# Patient Record
Sex: FEMALE | Race: WHITE | NOT HISPANIC OR LATINO | Employment: STUDENT | URBAN - NONMETROPOLITAN AREA
[De-identification: names, ages, dates, MRNs, and addresses within clinical notes are randomized per-mention and may not be internally consistent; named-entity substitution may affect disease eponyms.]

---
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
## 2025-01-16 DIAGNOSIS — R42 VERTIGO: Primary | ICD-10-CM

## 2025-01-20 ENCOUNTER — CLINICAL SUPPORT (OUTPATIENT)
Dept: PHYSICAL THERAPY | Facility: CLINIC | Age: 20
End: 2025-01-20
Payer: MEDICAID

## 2025-01-20 DIAGNOSIS — H83.2X1 VESTIBULAR HYPOFUNCTION OF RIGHT EAR: Primary | ICD-10-CM

## 2025-01-20 PROCEDURE — 97162 PT EVAL MOD COMPLEX 30 MIN: CPT | Mod: GP | Performed by: PHYSICAL THERAPIST

## 2025-01-20 NOTE — PROGRESS NOTES
"Physical Therapy Evaluation    Patient Name: Leeanne Sanchez  MRN: [id number]  Evaluation Date: 1/20/2025  Time Calculation  Start Time: 1345  Stop Time: 1415  Time Calculation (min): 30 min    Current Problem  Problem List Items Addressed This Visit             ICD-10-CM    Vestibular hypofunction of right ear - Primary H83.2X1     01/16/2025: EVAL ONLY, AUTH REQ. 100% COVERAGE, 30V PT, CCP     Subjective   General:       Patient reported hx of injury: since 6th grade, she has black outs where her vision goes away, some times she feels shaky, but this is not noticeable to others.  Some times she has to sit to recover.  Never looses consciousness.  Also notes she gets dizziness when she stares at water/liquid moving, ot other spinning or moving objects.  Pt does drive.  Pt rides 4 wheelers-never blacks out while riding vehicle or 4 wheelers, or when fabio, usually happens when standing. Pt did go to ED when she was lightheaded at home point and got referral for ENT, who she saw not too long ago, and his note states weakness R ear, possibly to remote viral infection.  Pt calix not recall having any BP testing done in lye sit stand.    Red Flags:  visual blackouts \"often\"-most days, at times > 1x/day    Precautions:   Fall risk due to visual blackouts  Pain:   Mild 3-4 ache L temple now, worse is 5/10 but not sure if it is in different spots of head or now.    Home Living:     Pt generally gets around home safely      Work:  full time.  stand walk lifting     Prior Function Per Pt/Caregiver Report:   Same for some years now, but prior to 6th grade does not recall having these issues  Pt goals: improve symptoms    OBJECTIVE:  If left blank, assume NT:    TUG: < 13\"    Objective   Vestibular/Balance Assessment:    Occulomotor, Vertigo Tests, etc. +/-   Smooth pursuits -   Saccades -   VOR -   Sharp-Eduardo Test -   [place]-Hallpike L    Stefan-Hallpike R    Roll Test R    Roll Test L    JPSE of C-spine    [place] " "Convergence -, but L eye goes out to the side after converging    HIT    Head shake test        Balance Test Scores    MCTSIB 4/4 she was off   SLS  R 17\",  L > 30\"   DGI 10/12                   Gait:   WNL      Outcome Measures:  DHI = 2    OP EDUCATION:   HEP, pt poc    Today's Treatment:  No treatment billed today as pt requires prior authorization  HEP to be completed daily, exercises include:  Access Code: [email or web address]  URL: [email or web address]  Date: 01/20/2025  Prepared by: Lei Skelton    Exercises  - Seated Gaze Stabilization with Head Rotation  - 3 x daily - 7 x weekly - 3 sets - 30\" hold  - Imaginary Target with Head Nod  - 3 x daily - 7 x weekly - 3 sets - 30\" hold    Assessment       pt is with chronic right ear weakness and visual blackouts, and needs PT to decrease symptoms to improve function.      Based on the history including personal factors and/or comorbidities, examination of body systems including body structures and function, activity limitations, and/or participation restrictions, as well as clinical presentation, patient meets criteria for a moderate complexity evaluation.    Plan     Next visit review of HEP technique, test blood pressure supine to standing.  At some point perform repetitive range of motion on cervical spine to see if any effect on headache.  Consider issuing pencil push-ups for HEP.  Skilled PT consisting of:  Aquatics, therapeutic exercise, therapeutic activity, NMR, manual, thermal, electric stimulation, US, light therapy, gait training, transfer training, dry needle.  Mechanical traction PRN and only if OK by PT, canalith repositioning  Rehab Potential: good  Frequency:  2x/wk  Duration: 20 weeks      Goals:    STG:   Pt to be I in initial HEP.    LTG\"s:  Patient to state 50% decrease in episodes of visual blacking out.  Single-leg stance 2 x 30 seconds each for improved stability.  Decrease maximal headache pain to 2-3 out of 10.    Some of This note was created " using voice recognition software and was not corrected for typographical or grammatical errors.

## 2025-02-19 ENCOUNTER — TREATMENT (OUTPATIENT)
Dept: PHYSICAL THERAPY | Facility: CLINIC | Age: 20
End: 2025-02-19
Payer: MEDICAID

## 2025-02-19 DIAGNOSIS — H83.2X1 VESTIBULAR HYPOFUNCTION OF RIGHT EAR: ICD-10-CM

## 2025-02-19 PROCEDURE — 97112 NEUROMUSCULAR REEDUCATION: CPT | Mod: GP | Performed by: PHYSICAL THERAPIST

## 2025-02-19 NOTE — PROGRESS NOTES
"Physical Therapy Treatment    Patient Name: Leeanne Sanchez  \"Cat\"  MRN: [id number]  Encounter date:  2/19/2025  Time Calculation  Start Time: 1000  Stop Time: 1038  Time Calculation (min): 38 min     PT Therapeutic Procedures Time Entry  Neuromuscular Re-Education Time Entry: 38    Visit Number:  2 (including evaluation)  Planned total visits: 10-20  Visit Authorized:   Auth Rcvd 20 visits 2/4-4/15 CPTs [id number]    01/16/2025: EVAL ONLY, AUTH REQ. 100% COVERAGE, 30V PT, [facility]     Current Problem  Problem List Items Addressed This Visit             ICD-10-CM    Vestibular hypofunction of right ear H83.2X1       Precautions    Fall risk due to visual blackouts     Pain   0  Location  description  Subjective  General        Pt states continues to have visual blackouts, but not sure how often in last 2 weeks, states \"not too often\" .  pt had L temple pain this morning and took excederine migraine pill and pain went away.  Works as a cook at fast food.  States no progress with how often she has blackouts, but she reports less frequency compared to I.E.    Objective  + decr tracking with L eye with pencil push up  BP supine after resting 5 minutes: 110/62  BP with initial standing after being supine for several minutes: 122/70    Treatment:     Seated on plinth:  VOR x 30 seconds horizontal  VOR x 30 seconds vertical  Seated on fifth disc:   VOR vertical 2 x 30 seconds   VOR horizontal 2 x 30 seconds  Pencil push-ups 3 x 30 seconds  Eyes open with narrow base of support 2 minutes x 2  Eyes closed with narrow base of support 1 minute    Standing:  Single-leg stance up to 30 seconds each      OP EDUCATION:   And need to do VOR 30 seconds and 30 seconds for 5 minutes 3 times a day, and that this will take a couple or few months to make significant improvements for her vestibular hypofunction  Unclear if this could be causing any of her reported visual blackouts  Blood pressure does not seem to be " "a factor in causing any of her visual blackouts    Assessment:     Pt's response to treatment:  pt needed cues to keep gaze with pencil push ups, as L eye was not tracking pencil, and then with good form.   Areas of improvements:  HEP knowledge  Limitations/deficits:  visual black outs    Pain end of session: No pain    Continued PT required to reach all goals and restore function    Plan      Next visit review of HEP technique, test blood pressure supine to standing.  At some point perform repetitive range of motion on cervical spine to see if any effect on headache.  Consider issuing pencil push-ups for HEP.    Skilled PT consisting of:  Aquatics, therapeutic exercise, therapeutic activity, NMR, manual, thermal, electric stimulation, US, light therapy, gait training, transfer training, dry needle.  Mechanical traction PRN and only if OK by PT, canalith repositioning  Rehab Potential: good  Frequency:  2x/wk  Duration: 20 weeks        Goals:     STG:   Pt to be I in initial HEP.     LTG\"s:  Patient to state 50% decrease in episodes of visual blacking out.  Single-leg stance 2 x 30 seconds each for improved stability.  Decrease maximal headache pain to 2-3 out of 10.    Some of This note was created using voice recognition software and was not corrected for typographical or grammatical errors.          "

## 2025-02-24 ENCOUNTER — TREATMENT (OUTPATIENT)
Dept: PHYSICAL THERAPY | Facility: CLINIC | Age: 20
End: 2025-02-24
Payer: MEDICAID

## 2025-02-24 DIAGNOSIS — H83.2X1 VESTIBULAR HYPOFUNCTION OF RIGHT EAR: ICD-10-CM

## 2025-02-24 DIAGNOSIS — R55 BLACK-OUT (NOT AMNESIA): Primary | ICD-10-CM

## 2025-02-24 PROCEDURE — 97112 NEUROMUSCULAR REEDUCATION: CPT | Mod: GP | Performed by: PHYSICAL THERAPIST

## 2025-02-24 PROCEDURE — 97110 THERAPEUTIC EXERCISES: CPT | Mod: GP | Performed by: PHYSICAL THERAPIST

## 2025-02-24 NOTE — PROGRESS NOTES
"Physical Therapy Treatment    Patient Name: Leeanne Sanchez  \"Cat\"  MRN: [id number]  Encounter date:  2/24/2025  Time Calculation  Start Time: 1347  Stop Time: 1425  Time Calculation (min): 38 min     PT Therapeutic Procedures Time Entry  Neuromuscular Re-Education Time Entry: 13  Therapeutic Exercise Time Entry: 25    Visit Number:  3 (including evaluation)  Planned total visits: 10-20  Visit Authorized:   Auth Rcvd 20 visits 2/4-4/15 CPTs [id number]    01/16/2025: EVAL ONLY, AUTH REQ. 100% COVERAGE, 30V PT, [facility]     Current Problem  Problem List Items Addressed This Visit             ICD-10-CM    Vestibular hypofunction of right ear H83.2X1       Precautions    Fall risk due to visual blackouts     Pain   4-5/10 pt states for at least a month she has had right UBP, no BHAKTI  Location right upper back  Description pain    Subjective  General   No changes noticed as yet.     Prior:  Pt states continues to have visual blackouts, but not sure how often in last 2 weeks, states \"not too often\" .  pt had L temple pain this morning and took excederine migraine pill and pain went away.  Works as a cook at fast food.  States no progress with how often she has blackouts, but she reports less frequency compared to I.E.    Objective:  Carlos C-Spine Assessment    S/S WORSE:  overnight to AM at times, more so during the day or like overnight when he first, sometimes in evening   S/S BETTER:  meds  POSTURE:  slouched, forward head and shoulders  POSTURE CORRECTION:  NE  Pt reports their typical sitting surface is a bed, but does not sit a lot.    uses 2 pillows, uses a regular pillow, sleeps on sides mostly  HA fore head at times    Myotomes/MMT's R L   Shoulder abduction 5/5 5/5   Elbow flexion 5/5 5/5   Elbow extension 5/5 5/5   Wrist flexion     Wrist extension                   Cervical spine ROM S/S   Flexion wnl NE   Extension Min decr NE   Protrusion wnl wnl   Retraction Mod decr NE   R rot " "Min decr NE   L rot Min decr NE   R SB wnl NE   L SB wnl NE         S/S During S/S After   Rep ret  D B   Rep PRO     Rep SB                 NE=No effect, C=centralize, A=abolish, p=peripheralize, P=produce, B=better, W=worse D=decrease, I=increase, NW=no worse, NB=no better    provisional classification: posterior derangement, extension preference  No HA s/s with any C-spine ROM today    PRIOR:  + decr tracking with L eye with pencil push up  BP supine after resting 5 minutes: 110/62  BP with initial standing after being supine for several minutes: 122/70    Treatment:     Seated on plinth:  VOR x 30 seconds horizontal  VOR x 30 seconds vertical  Pencil push ups 3 x 30\"  C-retraction 2 x 10  C-spine ROM per objective section  Posture education including ergonomics to improve posture    Seated on fifth disc: DNP  VOR vertical 2 x 30 seconds   VOR horizontal 2 x 30 seconds  Pencil push-ups 3 x 30 seconds  Eyes open with narrow base of support 2 minutes x 2  Eyes closed with narrow base of support 1 minute    Standing: DNP  Single-leg stance up to 30 seconds each      OP EDUCATION:    New HEP:    Access Code: [email or web address]  URL: [email or web address]  Date: 02/24/2025  Prepared by: Lei Skelton    Exercises  - Neck Retraction  - 3-5 x daily - 7 x weekly - 1 sets - 10 reps  - Pencil Pushups  - 1 x daily - 7 x weekly - 3 sets - 10 reps  - Seated Correct Posture  - 7 x weekly  Prior:   need to do VOR 30 seconds on and 30 seconds off for 5 minutes 3 times a day, and that this will take a couple or few months to make significant improvements for her vestibular hypofunction  Unclear if this could be causing any of her reported visual blackouts  Blood pressure does not seem to be a factor in causing any of her visual blackouts    Assessment:     Pt's response to treatment:  pt no longer needs cues to keep gaze with pencil push ups,.  Pt with improving neck retraction after cues and demo, but not great yet  Areas of " "improvements:  HEP knowledge, posture knowledge  Limitations/deficits:  visual black outs    Pain end of session: No pain    Continued PT required to reach all goals and restore function    Plan      Next session work on more VOR related activity, posture, diaphragmatic breathing.  Ask if correcting posture and if she has had less pain.  Give handout for neuro referral from EMR.    Skilled PT consisting of:  Aquatics, therapeutic exercise, therapeutic activity, NMR, manual, thermal, electric stimulation, US, light therapy, gait training, transfer training, dry needle.  Mechanical traction PRN and only if OK by PT, canalith repositioning  Rehab Potential: good  Frequency:  2x/wk  Duration: 20 weeks        Goals:     STG:   Pt to be I in initial HEP.     LTG\"s:  Patient to state 50% decrease in episodes of visual blacking out.  Single-leg stance 2 x 30 seconds each for improved stability.  Decrease maximal headache pain to 2-3 out of 10.    Some of This note was created using voice recognition software and was not corrected for typographical or grammatical errors.          "

## 2025-02-26 ENCOUNTER — TREATMENT (OUTPATIENT)
Dept: PHYSICAL THERAPY | Facility: CLINIC | Age: 20
End: 2025-02-26
Payer: MEDICAID

## 2025-02-26 DIAGNOSIS — H83.2X1 VESTIBULAR HYPOFUNCTION OF RIGHT EAR: ICD-10-CM

## 2025-02-26 PROCEDURE — 97112 NEUROMUSCULAR REEDUCATION: CPT | Mod: GP | Performed by: PHYSICAL THERAPIST

## 2025-02-26 NOTE — PROGRESS NOTES
"Physical Therapy Treatment    Patient Name: Leeanne Sanchez  \"Cat\"  MRN: [id number]  Encounter date:  2/26/2025  Time Calculation  Start Time: 1000  Stop Time: 1038  Time Calculation (min): 38 min     PT Therapeutic Procedures Time Entry  Neuromuscular Re-Education Time Entry: 38    Visit Number:  4 (including evaluation)  Planned total visits: 10-20  Visit Authorized:   Auth Rcvd 20 visits 2/4-4/15 CPTs [id number]    01/16/2025: EVAL ONLY, AUTH REQ. 100% COVERAGE, 30V PT, [facility]     Current Problem  Problem List Items Addressed This Visit             ICD-10-CM    Vestibular hypofunction of right ear H83.2X1       Precautions    Fall risk due to visual blackouts     Pain   0  Location right upper back  Description pain    Subjective  General   does not have pain today     Prior:  Pt states continues to have visual blackouts, but not sure how often in last 2 weeks, states \"not too often\" .  pt had L temple pain this morning and took excederine migraine pill and pain went away.  Works as a cook at fast food.  States no progress with how often she has blackouts, but she reports less frequency compared to I.E.    Objective:  Sits in slumped posture on plinth    Prior:  Carlos C-Spine Assessment    S/S WORSE:  overnight to AM at times, more so during the day or like overnight when he first, sometimes in evening   S/S BETTER:  meds  POSTURE:  slouched, forward head and shoulders  POSTURE CORRECTION:  NE  Pt reports their typical sitting surface is a bed, but does not sit a lot.    uses 2 pillows, uses a regular pillow, sleeps on sides mostly  HA fore head at times    Myotomes/MMT's R L   Shoulder abduction 5/5 5/5   Elbow flexion 5/5 5/5   Elbow extension 5/5 5/5   Wrist flexion     Wrist extension                   Cervical spine ROM S/S   Flexion wnl NE   Extension Min decr NE   Protrusion wnl wnl   Retraction Mod decr NE   R rot Min decr NE   L rot Min decr NE   R SB wnl NE   L SB wnl NE " "        S/S During S/S After   Rep ret  D B   Rep PRO     Rep SB                 NE=No effect, C=centralize, A=abolish, p=peripheralize, P=produce, B=better, W=worse D=decrease, I=increase, NW=no worse, NB=no better    provisional classification: posterior derangement, extension preference  No HA s/s with any C-spine ROM today    PRIOR:  + decr tracking with L eye with pencil push up  BP supine after resting 5 minutes: 110/62  BP with initial standing after being supine for several minutes: 122/70    Treatment:     Seated on plinth:  VOR 2 x 30 seconds horizontal  VOR 2 x 30 seconds vertical  Pencil push ups 3 x 30\"  C-retraction 2 x 10 (on chair)  Posture education and performance of slouch correct 10 x 10\"    Seated on fifth disc: DNP  VOR vertical 2 x 30 seconds   VOR horizontal 2 x 30 seconds  Pencil push-ups 3 x 30 seconds  Eyes open with narrow base of support 2 minutes x 2  Eyes closed with narrow base of support 1 minute    Standing:   Single-leg stance 2x 30 seconds each   tandem 2 x 30\"  Stand on foam NBOS  VOR vertical 2 x 30 seconds   VOR horizontal 2 x 30 seconds    Supine:  Diaphragmatic breathing with 3# on abdomen, x 3'      OP EDUCATION:  Printed neuro referral from [facility] for pt  Benefits of diaphragmatic breathing    New HEP:    Access Code: [email or web address]  URL: [email or web address]  Date: 02/24/2025  Prepared by: Lei Skelton    Exercises  - Neck Retraction  - 3-5 x daily - 7 x weekly - 1 sets - 10 reps  - Pencil Pushups  - 1 x daily - 7 x weekly - 3 sets - 10 reps  - Seated Correct Posture  - 7 x weekly  Prior:   need to do VOR 30 seconds on and 30 seconds off for 5 minutes 3 times a day, and that this will take a couple or few months to make significant improvements for her vestibular hypofunction  Unclear if this could be causing any of her reported visual blackouts  Blood pressure does not seem to be a factor in causing any of her visual blackouts    Assessment:     Pt's response to " "treatment: Pt needed more cues for improving neck retraction, including watching a short video on this  Areas of improvements:  HEP knowledge, posture knowledge  Limitations/deficits:  visual black outs    Pain end of session: No pain    Continued PT required to reach all goals and restore function    Plan      Next session work on more VOR related activity, posture, diaphragmatic breathing.  Give handout for diaphragmatic breathing.      Skilled PT consisting of:  Aquatics, therapeutic exercise, therapeutic activity, NMR, manual, thermal, electric stimulation, US, light therapy, gait training, transfer training, dry needle.  Mechanical traction PRN and only if OK by PT, canalith repositioning  Rehab Potential: good  Frequency:  2x/wk  Duration: 20 weeks        Goals:     STG:   Pt to be I in initial HEP.     LTG\"s:  Patient to state 50% decrease in episodes of visual blacking out.  Single-leg stance 2 x 30 seconds each for improved stability.  Decrease maximal headache pain to 2-3 out of 10.    Some of This note was created using voice recognition software and was not corrected for typographical or grammatical errors.          "

## 2025-03-10 ENCOUNTER — TREATMENT (OUTPATIENT)
Dept: PHYSICAL THERAPY | Facility: CLINIC | Age: 20
End: 2025-03-10
Payer: MEDICAID

## 2025-03-10 DIAGNOSIS — H83.2X1 VESTIBULAR HYPOFUNCTION OF RIGHT EAR: ICD-10-CM

## 2025-03-10 PROCEDURE — 97112 NEUROMUSCULAR REEDUCATION: CPT | Mod: GP | Performed by: PHYSICAL THERAPIST

## 2025-03-10 PROCEDURE — 97110 THERAPEUTIC EXERCISES: CPT | Mod: GP | Performed by: PHYSICAL THERAPIST

## 2025-03-10 NOTE — PROGRESS NOTES
"Physical Therapy Treatment    Patient Name: Leeanne Sanchez  \"Cat\"  MRN: [id number]  Encounter date:  3/10/2025  Time Calculation  Start Time: 1050  Stop Time: 1128  Time Calculation (min): 38 min     PT Therapeutic Procedures Time Entry  Neuromuscular Re-Education Time Entry: 23  Therapeutic Exercise Time Entry: 15    Visit Number:  5 (including evaluation)  Planned total visits: 10-20  Visit Authorized:   Auth Rcvd 20 visits 2/4-4/15 CPTs [phone] [id number]    01/16/2025: EVAL ONLY, AUTH REQ. 100% COVERAGE, 30V PT, [facility]     Current Problem  Problem List Items Addressed This Visit             ICD-10-CM    Vestibular hypofunction of right ear H83.2X1       Precautions    Fall risk due to visual blackouts     Pain   0  Location right upper back  Description pain    Subjective  General   does not have pain today.  No visual blackouts yesterday or today-but has been off of work for these days.     Prior:  Pt states continues to have visual blackouts, but not sure how often in last 2 weeks, states \"not too often\" .  pt had L temple pain this morning and took excederine migraine pill and pain went away.  Works as a cook at fast food.  States no progress with how often she has blackouts, but she reports less frequency compared to I.E.    Objective:  Sits in slouched posture on plinth    Prior:  Carlos C-Spine Assessment    S/S WORSE:  overnight to AM at times, more so during the day or like overnight when he first, sometimes in evening   S/S BETTER:  meds  POSTURE:  slouched, forward head and shoulders  POSTURE CORRECTION:  NE  Pt reports their typical sitting surface is a bed, but does not sit a lot.    uses 2 pillows, uses a regular pillow, sleeps on sides mostly  HA fore head at times    Myotomes/MMT's R L   Shoulder abduction 5/5 5/5   Elbow flexion 5/5 5/5   Elbow extension 5/5 5/5   Wrist flexion     Wrist extension                   Cervical spine ROM S/S   Flexion wnl NE   Extension Min " "decr NE   Protrusion wnl wnl   Retraction Mod decr NE   R rot Min decr NE   L rot Min decr NE   R SB wnl NE   L SB wnl NE         S/S During S/S After   Rep ret  D B   Rep PRO     Rep SB                 NE=No effect, C=centralize, A=abolish, p=peripheralize, P=produce, B=better, W=worse D=decrease, I=increase, NW=no worse, NB=no better    provisional classification: posterior derangement, extension preference  No HA s/s with any C-spine ROM today    PRIOR:  + decr tracking with L eye with pencil push up  BP supine after resting 5 minutes: 110/62  BP with initial standing after being supine for several minutes: 122/70    Treatment:     Seated on plinth: DNP  VOR 2 x 30 seconds horizontal  VOR 2 x 30 seconds vertical  Pencil push ups 3 x 30\"  Posture education and performance of slouch correct 10 x 10\"    Seated on fifth disc:   VOR vertical 2 x 30 seconds   VOR horizontal 2 x 30 seconds  Pencil push-ups 3 x 30 seconds with and without VOR  Eyes open with narrow base of support 2 minutes x 2  Eyes closed with narrow base of support 1 minute DNP  C-retraction 2 x 10 (on chair)  STS x 12 with VOR    Standing:   Single-leg stance 2x 30 seconds each DNP   tandem 2 x 30\" DNP  Stand on foam NBOS DNP  VOR vertical 2 x 30 seconds   Amb with VOR and VOR with convergence 3 x 50' ea   busy \"B\" chart and DVA chart for VOR    Supine:  Diaphragmatic breathing with 3# on abdomen, x 3'      OP EDUCATION:  Add busy \"B\" chart and DVA chart for VOR for HEP  Ask neurologist what they think is causing visual blackouts    Prior:  Printed neuro referral from [facility] for pt  Benefits of diaphragmatic breathing    New HEP:    Access Code: [email or web address]  URL: [email or web address]  Date: 02/24/2025  Prepared by: Lei Skelton    Exercises  - Neck Retraction  - 3-5 x daily - 7 x weekly - 1 sets - 10 reps  - Pencil Pushups  - 1 x daily - 7 x weekly - 3 sets - 10 reps  - Seated Correct Posture  - 7 x weekly  Prior:   need to do VOR 30 " "seconds on and 30 seconds off for 5 minutes 3 times a day, and that this will take a couple or few months to make significant improvements for her vestibular hypofunction  Unclear if this could be causing any of her reported visual blackouts  Blood pressure does not seem to be a factor in causing any of her visual blackouts    Assessment:     Pt's response to treatment: Pt needed more cues for improving neck retraction including demo from PT  Areas of improvements:  HEP knowledge, posture knowledge  Limitations/deficits:  visual black outs    Pain end of session: No pain    Continued PT required to reach all goals and restore function    Plan      May put on hold soon for HEP focus    Skilled PT consisting of:  Aquatics, therapeutic exercise, therapeutic activity, NMR, manual, thermal, electric stimulation, US, light therapy, gait training, transfer training, dry needle.  Mechanical traction PRN and only if OK by PT, canalith repositioning  Rehab Potential: good  Frequency:  2x/wk  Duration: 20 weeks        Goals:     STG:   Pt to be I in initial HEP.-goal met 3/10/25     LTG\"s:  Patient to state 50% decrease in episodes of visual blacking out.  Single-leg stance 2 x 30 seconds each for improved stability.  Decrease maximal headache pain to 2-3 out of 10.    Some of This note was created using voice recognition software and was not corrected for typographical or grammatical errors.          "

## 2025-03-12 ENCOUNTER — APPOINTMENT (OUTPATIENT)
Dept: PHYSICAL THERAPY | Facility: CLINIC | Age: 20
End: 2025-03-12
Payer: MEDICAID

## 2025-03-18 NOTE — PROGRESS NOTES
Subjective     Patient ID: Leeanne Sanchez is a 19 y.o. female who presents for Establish Care (Here to est care; headaches and chest pain has been an issue; ).    HPI    Vestibular hypofunction of right ear- Saw ENT and is going to PT    Upper back pain, has history of scoliosis, she is slouched over.     Headaches- when she wakes up and sometimes through out the day, she states they happen most mornings. States they are 3-4 days a week. States her headaches are in her temples, achy. She does take Excedrin for these, which does help. Has been going on for a few months, not noted if they start around her period or not.     Wears glasses- had yes checked a year ago.     LMP- 3/14/25    Chest pain- Breathing hurts, states it happens every now and then, it is sharp, stops her from breathing in. Has been going on for a year. Does not radiate, does not know what starts it, does not notice palpitations, at times does have shortness of breath.    Went to ED September last year, she was feeling light headed and dizzy. Labs showed K+ was 3.2, states she took a potassium pill. She     Vestibular hypofunction of right ear. Was seen by ENT, and referred  to PT, she completed this and is now doing her exercises at home. She was having black out, she was not losing consciousness. States she was having temple pain was taking Excedrin and pain went away.     Sister had brain cancer    Works as a cook at fast food. States no progress with how often she has blackouts, but she reports less frequency compared to     Social history:  reports that she has never smoked. Her smokeless tobacco use includes snuff. She reports that she does not drink alcohol and does not use drugs.    Past Medical History:   Diagnosis Date    Acute upper respiratory infection, unspecified 10/27/2021    Viral URI with cough    Body mass index (BMI) pediatric, 85th percentile to less than 95th percentile for age 07/03/2019    BMI (body mass index),  "pediatric, 85% to less than 95% for age    Cellulitis of right finger 04/16/2017    Cellulitis of index finger, right    Contact with and (suspected) exposure to covid-19 03/24/2021    Exposure to COVID-19 virus    Cough, unspecified 04/30/2013    Cough    Encounter for immunization 10/27/2021    Encounter for immunization    Encounter for routine child health examination with abnormal findings 07/03/2019    Encounter for routine child health examination with abnormal findings    Open bite of unspecified finger without damage to nail, subsequent encounter 04/16/2017    Dog bite of finger, subsequent encounter    Pain in left foot 02/08/2019    Left foot pain    Personal history of other diseases of the digestive system 10/27/2016    History of dental abscess    Plantar wart 02/13/2019    Plantar wart, left foot    Scoliosis     Unspecified injury of unspecified foot, initial encounter 09/30/2014    Foot injury      SurgHx:   Past Surgical History:   Procedure Laterality Date    OTHER SURGICAL HISTORY  09/02/2020    No history of surgery      Fhx: No family history on file.   Medications: No current outpatient medications on file.   Allergies: No Known Allergies     Review of systems completed and unremarkable other than what is documented in HPI.    Objective   /85 (BP Location: Right arm, Patient Position: Sitting, BP Cuff Size: Adult)   Pulse 69   Ht 1.575 m (5' 2\")   Wt 63.9 kg (140 lb 12.8 oz)   BMI 25.75 kg/m²     Patient Health Questionnaire-2 Score: 0 (3/21/2025 10:05 AM)    Physical Exam    Gen: No acute distress, alert and oriented x3, pleasant   HEENT: moist mucous membranes, b/l external auditory canals are clear of debris, TMs within normal limits, no oropharyngeal lesions, eomi, perrla   Neck: thyroid within normal limits, no lymphadenopathy   CV: RRR, normal S1/S2, no murmur   Resp: Clear to auscultation bilaterally, no wheezes or rhonchi appreciated  Abd: soft, nontender, non-distended, no " guarding/rigidity, bowel sounds present  Musc: Slight curvature of throcic/lumbar region of spine, pain in thoracic/cervical area- poor posture   Extr: no edema, no calf tenderness  Derm: Skin is warm and dry, no rashes appreciated  Psych: mood is good, affect is congruent, good hygiene, normal speech and eye contact  Neuro: cranial nerves grossly intact, normal gait      Assessment/Plan   Diagnoses and all orders for this visit:  Encounter to establish care  -     TSH with reflex to Free T4 if abnormal; Future  Temporal headache  -     TSH with reflex to Free T4 if abnormal; Future  -     Comprehensive Metabolic Panel; Future  -     ECG 12 lead (Ancillary Performed); Future  Screening for thyroid disorder  -     TSH with reflex to Free T4 if abnormal; Future  Encounter for screening for diseases of the blood and blood-forming organs and certain disorders involving the immune mechanism  -     CBC and Auto Differential; Future  Electrolyte imbalance  -     Comprehensive Metabolic Panel; Future  -     ECG 12 lead (Ancillary Performed); Future  Vestibular hypofunction of right ear  -     TSH with reflex to Free T4 if abnormal; Future  -     Comprehensive Metabolic Panel; Future  -     ECG 12 lead (Ancillary Performed); Future  Thoracogenic scoliosis of thoracolumbar region  Lipid screening  -     Lipid Panel; Future  Chest pain on breathing  -     TSH with reflex to Free T4 if abnormal; Future  -     Comprehensive Metabolic Panel; Future  -     ECG 12 lead (Ancillary Performed); Future      Follow up in 1 year sooner if needed

## 2025-03-21 ENCOUNTER — HOSPITAL ENCOUNTER (OUTPATIENT)
Dept: RADIOLOGY | Facility: HOSPITAL | Age: 20
Discharge: HOME | End: 2025-03-21
Payer: MEDICAID

## 2025-03-21 ENCOUNTER — HOSPITAL ENCOUNTER (OUTPATIENT)
Dept: CARDIOLOGY | Facility: HOSPITAL | Age: 20
Discharge: HOME | End: 2025-03-21
Payer: MEDICAID

## 2025-03-21 ENCOUNTER — APPOINTMENT (OUTPATIENT)
Dept: PRIMARY CARE | Facility: CLINIC | Age: 20
End: 2025-03-21
Payer: MEDICAID

## 2025-03-21 VITALS
WEIGHT: 140.8 LBS | HEIGHT: 62 IN | SYSTOLIC BLOOD PRESSURE: 128 MMHG | BODY MASS INDEX: 25.91 KG/M2 | DIASTOLIC BLOOD PRESSURE: 85 MMHG | HEART RATE: 69 BPM

## 2025-03-21 DIAGNOSIS — M41.35 THORACOGENIC SCOLIOSIS OF THORACOLUMBAR REGION: ICD-10-CM

## 2025-03-21 DIAGNOSIS — E87.8 ELECTROLYTE IMBALANCE: ICD-10-CM

## 2025-03-21 DIAGNOSIS — Z13.0 ENCOUNTER FOR SCREENING FOR DISEASES OF THE BLOOD AND BLOOD-FORMING ORGANS AND CERTAIN DISORDERS INVOLVING THE IMMUNE MECHANISM: ICD-10-CM

## 2025-03-21 DIAGNOSIS — Z76.89 ENCOUNTER TO ESTABLISH CARE: Primary | ICD-10-CM

## 2025-03-21 DIAGNOSIS — R51.9 TEMPORAL HEADACHE: ICD-10-CM

## 2025-03-21 DIAGNOSIS — Z13.29 SCREENING FOR THYROID DISORDER: ICD-10-CM

## 2025-03-21 DIAGNOSIS — R07.1 CHEST PAIN ON BREATHING: ICD-10-CM

## 2025-03-21 DIAGNOSIS — Z13.220 LIPID SCREENING: ICD-10-CM

## 2025-03-21 DIAGNOSIS — H83.2X1 VESTIBULAR HYPOFUNCTION OF RIGHT EAR: ICD-10-CM

## 2025-03-21 LAB
ATRIAL RATE: 55 BPM
P AXIS: 48 DEGREES
P OFFSET: 208 MS
P ONSET: 167 MS
PR INTERVAL: 104 MS
Q ONSET: 219 MS
QRS COUNT: 9 BEATS
QRS DURATION: 92 MS
QT INTERVAL: 440 MS
QTC CALCULATION(BAZETT): 420 MS
QTC FREDERICIA: 427 MS
R AXIS: 96 DEGREES
T AXIS: 40 DEGREES
T OFFSET: 439 MS
VENTRICULAR RATE: 55 BPM

## 2025-03-21 PROCEDURE — 72081 X-RAY EXAM ENTIRE SPI 1 VW: CPT

## 2025-03-21 PROCEDURE — 99204 OFFICE O/P NEW MOD 45 MIN: CPT

## 2025-03-21 PROCEDURE — 3008F BODY MASS INDEX DOCD: CPT

## 2025-03-21 PROCEDURE — 93005 ELECTROCARDIOGRAM TRACING: CPT

## 2025-03-21 ASSESSMENT — PATIENT HEALTH QUESTIONNAIRE - PHQ9
2. FEELING DOWN, DEPRESSED OR HOPELESS: NOT AT ALL
1. LITTLE INTEREST OR PLEASURE IN DOING THINGS: NOT AT ALL
SUM OF ALL RESPONSES TO PHQ9 QUESTIONS 1 & 2: 0

## 2025-03-22 LAB
ALBUMIN SERPL-MCNC: 5 G/DL (ref 3.6–5.1)
ALP SERPL-CCNC: 55 U/L (ref 36–128)
ALT SERPL-CCNC: 12 U/L (ref 5–32)
ANION GAP SERPL CALCULATED.4IONS-SCNC: 9 MMOL/L (CALC) (ref 7–17)
AST SERPL-CCNC: 16 U/L (ref 12–32)
BASOPHILS # BLD AUTO: 50 CELLS/UL (ref 0–200)
BASOPHILS NFR BLD AUTO: 0.8 %
BILIRUB SERPL-MCNC: 0.6 MG/DL (ref 0.2–1.1)
BUN SERPL-MCNC: 13 MG/DL (ref 7–20)
CALCIUM SERPL-MCNC: 10.3 MG/DL (ref 8.9–10.4)
CHLORIDE SERPL-SCNC: 101 MMOL/L (ref 98–110)
CHOLEST SERPL-MCNC: 219 MG/DL
CHOLEST/HDLC SERPL: 3 (CALC)
CO2 SERPL-SCNC: 29 MMOL/L (ref 20–32)
CREAT SERPL-MCNC: 0.81 MG/DL (ref 0.5–0.96)
EGFRCR SERPLBLD CKD-EPI 2021: 107 ML/MIN/1.73M2
EOSINOPHIL # BLD AUTO: 347 CELLS/UL (ref 15–500)
EOSINOPHIL NFR BLD AUTO: 5.5 %
ERYTHROCYTE [DISTWIDTH] IN BLOOD BY AUTOMATED COUNT: 12.5 % (ref 11–15)
GLUCOSE SERPL-MCNC: 87 MG/DL (ref 65–99)
HCT VFR BLD AUTO: 46.9 % (ref 35–45)
HDLC SERPL-MCNC: 74 MG/DL
HGB BLD-MCNC: 15.6 G/DL (ref 11.7–15.5)
LDLC SERPL CALC-MCNC: 125 MG/DL (CALC)
LYMPHOCYTES # BLD AUTO: 2098 CELLS/UL (ref 850–3900)
LYMPHOCYTES NFR BLD AUTO: 33.3 %
MCH RBC QN AUTO: 30.3 PG (ref 27–33)
MCHC RBC AUTO-ENTMCNC: 33.3 G/DL (ref 32–36)
MCV RBC AUTO: 91.1 FL (ref 80–100)
MONOCYTES # BLD AUTO: 447 CELLS/UL (ref 200–950)
MONOCYTES NFR BLD AUTO: 7.1 %
NEUTROPHILS # BLD AUTO: 3358 CELLS/UL (ref 1500–7800)
NEUTROPHILS NFR BLD AUTO: 53.3 %
NONHDLC SERPL-MCNC: 145 MG/DL (CALC)
PLATELET # BLD AUTO: 257 THOUSAND/UL (ref 140–400)
PMV BLD REES-ECKER: 10.6 FL (ref 7.5–12.5)
POTASSIUM SERPL-SCNC: 4.3 MMOL/L (ref 3.8–5.1)
PROT SERPL-MCNC: 7.7 G/DL (ref 6.3–8.2)
RBC # BLD AUTO: 5.15 MILLION/UL (ref 3.8–5.1)
SODIUM SERPL-SCNC: 139 MMOL/L (ref 135–146)
TRIGL SERPL-MCNC: 95 MG/DL
TSH SERPL-ACNC: 0.95 MIU/L
WBC # BLD AUTO: 6.3 THOUSAND/UL (ref 3.8–10.8)

## 2025-05-02 ENCOUNTER — TELEPHONE (OUTPATIENT)
Dept: PRIMARY CARE | Facility: CLINIC | Age: 20
End: 2025-05-02
Payer: MEDICAID

## 2025-05-07 ENCOUNTER — APPOINTMENT (OUTPATIENT)
Dept: CARDIOLOGY | Facility: HOSPITAL | Age: 20
End: 2025-05-07
Payer: MEDICAID

## 2025-05-07 ENCOUNTER — APPOINTMENT (OUTPATIENT)
Dept: RADIOLOGY | Facility: HOSPITAL | Age: 20
End: 2025-05-07
Payer: MEDICAID

## 2025-05-07 ENCOUNTER — HOSPITAL ENCOUNTER (EMERGENCY)
Facility: HOSPITAL | Age: 20
Discharge: HOME | End: 2025-05-07
Attending: FAMILY MEDICINE
Payer: MEDICAID

## 2025-05-07 VITALS
HEART RATE: 59 BPM | OXYGEN SATURATION: 98 % | HEIGHT: 63 IN | SYSTOLIC BLOOD PRESSURE: 126 MMHG | WEIGHT: 140 LBS | BODY MASS INDEX: 24.8 KG/M2 | DIASTOLIC BLOOD PRESSURE: 74 MMHG | RESPIRATION RATE: 18 BRPM | TEMPERATURE: 97.3 F

## 2025-05-07 DIAGNOSIS — R07.89 CHEST WALL PAIN: Primary | ICD-10-CM

## 2025-05-07 PROCEDURE — 2500000004 HC RX 250 GENERAL PHARMACY W/ HCPCS (ALT 636 FOR OP/ED): Mod: JZ,SE | Performed by: FAMILY MEDICINE

## 2025-05-07 PROCEDURE — 71046 X-RAY EXAM CHEST 2 VIEWS: CPT

## 2025-05-07 PROCEDURE — 99284 EMERGENCY DEPT VISIT MOD MDM: CPT | Mod: 25 | Performed by: FAMILY MEDICINE

## 2025-05-07 PROCEDURE — 93005 ELECTROCARDIOGRAM TRACING: CPT

## 2025-05-07 PROCEDURE — 96372 THER/PROPH/DIAG INJ SC/IM: CPT | Performed by: FAMILY MEDICINE

## 2025-05-07 PROCEDURE — 71046 X-RAY EXAM CHEST 2 VIEWS: CPT | Performed by: STUDENT IN AN ORGANIZED HEALTH CARE EDUCATION/TRAINING PROGRAM

## 2025-05-07 PROCEDURE — 2500000004 HC RX 250 GENERAL PHARMACY W/ HCPCS (ALT 636 FOR OP/ED): Mod: JZ,SE

## 2025-05-07 PROCEDURE — 96372 THER/PROPH/DIAG INJ SC/IM: CPT

## 2025-05-07 RX ORDER — ORPHENADRINE CITRATE 30 MG/ML
60 INJECTION INTRAMUSCULAR; INTRAVENOUS ONCE
Status: COMPLETED | OUTPATIENT
Start: 2025-05-07 | End: 2025-05-07

## 2025-05-07 RX ORDER — DICLOFENAC SODIUM 10 MG/G
4 GEL TOPICAL 3 TIMES DAILY PRN
Qty: 50 G | Refills: 0 | Status: SHIPPED | OUTPATIENT
Start: 2025-05-07

## 2025-05-07 RX ORDER — KETOROLAC TROMETHAMINE 30 MG/ML
30 INJECTION, SOLUTION INTRAMUSCULAR; INTRAVENOUS ONCE
Status: COMPLETED | OUTPATIENT
Start: 2025-05-07 | End: 2025-05-07

## 2025-05-07 RX ORDER — METHYLPREDNISOLONE 4 MG/1
TABLET ORAL
Qty: 21 TABLET | Refills: 0 | Status: SHIPPED | OUTPATIENT
Start: 2025-05-07 | End: 2025-05-14

## 2025-05-07 RX ORDER — KETOROLAC TROMETHAMINE 30 MG/ML
INJECTION, SOLUTION INTRAMUSCULAR; INTRAVENOUS
Status: COMPLETED
Start: 2025-05-07 | End: 2025-05-07

## 2025-05-07 RX ORDER — TIZANIDINE HYDROCHLORIDE 4 MG/1
4 CAPSULE, GELATIN COATED ORAL 3 TIMES DAILY PRN
Qty: 20 CAPSULE | Refills: 0 | Status: SHIPPED | OUTPATIENT
Start: 2025-05-07 | End: 2026-05-07

## 2025-05-07 RX ADMIN — KETOROLAC TROMETHAMINE 30 MG: 30 INJECTION, SOLUTION INTRAMUSCULAR; INTRAVENOUS at 19:58

## 2025-05-07 RX ADMIN — ORPHENADRINE CITRATE 60 MG: 60 INJECTION INTRAMUSCULAR; INTRAVENOUS at 19:48

## 2025-05-07 RX ADMIN — KETOROLAC TROMETHAMINE 30 MG: 30 INJECTION, SOLUTION INTRAMUSCULAR at 19:58

## 2025-05-07 ASSESSMENT — COLUMBIA-SUICIDE SEVERITY RATING SCALE - C-SSRS
6. HAVE YOU EVER DONE ANYTHING, STARTED TO DO ANYTHING, OR PREPARED TO DO ANYTHING TO END YOUR LIFE?: NO
2. HAVE YOU ACTUALLY HAD ANY THOUGHTS OF KILLING YOURSELF?: NO
1. IN THE PAST MONTH, HAVE YOU WISHED YOU WERE DEAD OR WISHED YOU COULD GO TO SLEEP AND NOT WAKE UP?: NO

## 2025-05-07 ASSESSMENT — PAIN DESCRIPTION - ORIENTATION: ORIENTATION: RIGHT

## 2025-05-07 ASSESSMENT — PAIN DESCRIPTION - PROGRESSION: CLINICAL_PROGRESSION: GRADUALLY WORSENING

## 2025-05-07 ASSESSMENT — PAIN DESCRIPTION - DESCRIPTORS: DESCRIPTORS: ACHING;BURNING

## 2025-05-07 ASSESSMENT — PAIN DESCRIPTION - FREQUENCY: FREQUENCY: INTERMITTENT

## 2025-05-07 ASSESSMENT — PAIN - FUNCTIONAL ASSESSMENT: PAIN_FUNCTIONAL_ASSESSMENT: 0-10

## 2025-05-07 ASSESSMENT — PAIN DESCRIPTION - LOCATION: LOCATION: BREAST

## 2025-05-07 ASSESSMENT — PAIN DESCRIPTION - PAIN TYPE: TYPE: ACUTE PAIN

## 2025-05-07 ASSESSMENT — PAIN SCALES - GENERAL
PAINLEVEL_OUTOF10: 6
PAINLEVEL_OUTOF10: 8

## 2025-05-08 NOTE — TELEPHONE ENCOUNTER
ECG looked pretty normal, had some bradycardia. Looks like she was just in the ED and that EKG was normal

## 2025-05-08 NOTE — ED PROVIDER NOTES
Emergency Department Provider Note       History of Present Illness     History provided by: Patient  Limitations to History: None  External Records Reviewed with Brief Summary: Reviewed care everywhere/epic chart    HPI:  Leeanne Sanchez is a 20 y.o. female comes the ED with complaint of substernal chest wall pain that began earlier in the day and continue to persist with any kind of heavy lifting/pushing/pulling or stretching of her chest wall.  Patient reports continued persist throughout her day at work and continued to bother her to the point where she has to leave and come to the ED and get evaluated.  Patient reports he took nothing to treat the discomfort as it has continued.  Patient is reported no other associate symptoms with the complaint and states that she is had this issue occur before several times in the past but generally it has resolved.  Patient in the ED is alert, cooperative, appears anxious, uncomfortable, but in no distress.    Physical Exam   Triage vitals:  T 36.6 °C (97.9 °F)  HR 66  BP (!) 142/92  RR 18  O2 99 % None (Room air)    Physical Exam  Vitals and nursing note reviewed. Exam conducted with a chaperone present.   Constitutional:       Appearance: Normal appearance.   HENT:      Head: Normocephalic and atraumatic.      Nose: Nose normal.      Mouth/Throat:      Mouth: Mucous membranes are moist.      Pharynx: Oropharynx is clear.   Eyes:      Extraocular Movements: Extraocular movements intact.      Conjunctiva/sclera: Conjunctivae normal.      Pupils: Pupils are equal, round, and reactive to light.   Cardiovascular:      Rate and Rhythm: Normal rate and regular rhythm.      Pulses: Normal pulses.      Heart sounds: Normal heart sounds, S1 normal and S2 normal.   Pulmonary:      Effort: Pulmonary effort is normal.      Breath sounds: Normal breath sounds and air entry.   Chest:      Chest wall: Tenderness present. No mass, lacerations, deformity, swelling, crepitus or  edema. There is no dullness to percussion.          Comments: Chest wall tender to the touch negative by range of motion  No signs of bruising//redness  Abdominal:      General: Abdomen is flat.   Musculoskeletal:         General: Normal range of motion.      Cervical back: Normal range of motion and neck supple.   Skin:     General: Skin is warm.      Capillary Refill: Capillary refill takes less than 2 seconds.   Neurological:      General: No focal deficit present.      Mental Status: She is alert and oriented to person, place, and time. Mental status is at baseline.      GCS: GCS eye subscore is 4. GCS verbal subscore is 5. GCS motor subscore is 6.   Psychiatric:         Mood and Affect: Mood normal.         XR chest 2 views   Final Result   Findings suggestive of reactive airways disease without evidence for   pneumonia.             MACRO:   None.        Signed by: Ezekiel Avilez 5/7/2025 8:01 PM   Dictation workstation:   [id number]          Medical Decision Making & ED Course     Pt upon arrival to the ED appeared to be anxious and uncomfortable but in no distress with stable vitals.  Discussed with pt the presenting complaints and clinically findings.  Reviewed with pt the epic chart and counseled the patient on chest wall pain and appropriate approach to management/treatments.  After assessment and evaluation patient given IM pain medication, imaging ordered, EKG reviewed, placed on monitor, and observed.  After treatment and a period of rest patient was reassessed and finally feeling little better, pain better controlled, no new physical complaints, vital signs stable, and final results were reviewed discussed with patient.  At this time is felt findings appear to be consistent more with a muscle skeletal strain of the chest wall and patient is given prescriptions for home educated in their appropriate for management.  Patient also encouraged to follow-up with her primary care doctor next several days  for recheck and order return to ED if she feels her pain continues to return for suspected recommendations.  Patient stable and discharged home.    Social Determinants of Health which Significantly Impact Care: Social Determinants of Health which Significantly Impact Care: None identified     EKG Independent Interpretation:1952 -- NSR rate 70.  Normal axis.  Normal intervals.  No ST-T changes or signs of ischemia.  Normal EKG with no findings of STEMI. Unchanged compared to old EKG    Independent Result Review and Interpretation:     Chronic conditions affecting the patient's care: As documented above in [facility]    The patient was discussed with the following consultants/services: None    Care Considerations: As documented above in [facility]    ED Course:  Diagnoses as of 05/07/25 2252   Chest wall pain       Disposition   As a result of the work-up, the patient was discharged home.  she was informed of her diagnosis and instructed to come back with any concerns or worsening of condition.  she and was agreeable to the plan as discussed above.  she was given the opportunity to ask questions.  All of the patient's questions were answered.    Procedures   Procedures        Jameel Oates MD  Emergency Medicine                                                            Jameel Oates MD  05/07/25 [id number]

## 2025-05-09 DIAGNOSIS — M41.35 THORACOGENIC SCOLIOSIS OF THORACOLUMBAR REGION: Primary | ICD-10-CM

## 2025-05-09 LAB
ATRIAL RATE: 70 BPM
P AXIS: 69 DEGREES
P OFFSET: 205 MS
P ONSET: 157 MS
PR INTERVAL: 124 MS
Q ONSET: 219 MS
QRS COUNT: 12 BEATS
QRS DURATION: 94 MS
QT INTERVAL: 418 MS
QTC CALCULATION(BAZETT): 451 MS
QTC FREDERICIA: 440 MS
R AXIS: 87 DEGREES
T AXIS: 14 DEGREES
T OFFSET: 428 MS
VENTRICULAR RATE: 70 BPM

## 2025-05-14 ENCOUNTER — DOCUMENTATION (OUTPATIENT)
Dept: PHYSICAL THERAPY | Facility: CLINIC | Age: 20
End: 2025-05-14
Payer: MEDICAID

## 2025-05-14 NOTE — PROGRESS NOTES
Physical Therapy    Discharge Summary    Name: Leeanne Sanchez  MRN: [id number]  : 2005  Date: 25    Discharge Summary: PT    Discharge Information: Date of discharge 25    Therapy Summary: pt had 5 PT sessions, with fair progress      Rehab Discharge Reason: Other pt I in HEP

## 2025-06-10 ENCOUNTER — TELEPHONE (OUTPATIENT)
Dept: PRIMARY CARE | Facility: CLINIC | Age: 20
End: 2025-06-10
Payer: MEDICAID

## 2025-06-10 DIAGNOSIS — R51.9 TEMPORAL HEADACHE: Primary | ICD-10-CM

## 2025-06-10 NOTE — TELEPHONE ENCOUNTER
Pt called in asking if she is able to get topamox prescribed now since she is still having headaches everyday and her she went and got her labs done.

## 2025-06-12 DIAGNOSIS — R51.9 TEMPORAL HEADACHE: Primary | ICD-10-CM

## 2025-06-12 RX ORDER — TOPIRAMATE 25 MG/1
25 TABLET, FILM COATED ORAL 2 TIMES DAILY
Qty: 60 TABLET | Refills: 5 | Status: SHIPPED | OUTPATIENT
Start: 2025-06-12 | End: 2025-12-09